# Patient Record
(demographics unavailable — no encounter records)

---

## 2019-09-17 NOTE — EDM.PDOC
ED HPI GENERAL MEDICAL PROBLEM





- General


Chief Complaint: Chest Pain


Stated Complaint: CHEST PAIN X 1 DAY


Time Seen by Provider: 09/17/19 17:59


Source of Information: Reports: Patient, RN Notes Reviewed





- History of Present Illness


INITIAL COMMENTS - FREE TEXT/NARRATIVE: 





20 year old female with onset of scratchy sore throat and nasal clarisa. 2 days 

ago that continues today.  She also has had achy chest discomfort and 

generalized myalgias. No fever or chills.  Mildly painful to swallow. Not 

coughing much.  Mainly worried about the chest discomfort.  On metropolol for 

palipations. States her dosage was just increased about 10 days ago. 


  ** Middle Chest


Pain Score (Numeric/FACES): 5





- Related Data


 Allergies











Allergy/AdvReac Type Severity Reaction Status Date / Time


 


No Known Allergies Allergy   Verified 09/17/19 16:43











Home Meds: 


 Home Meds





Metoprolol Succinate 50 mg PO DAILY 09/17/19 [History]


Venlafaxine [Effexor XR] 150 mg PO DAILY 09/17/19 [History]











Past Medical History


Cardiovascular History: Reports: Other (See Below)


Other Cardiovascular History: heart palpitations


Psychiatric History: Reports: Depression





Social & Family History





- Tobacco Use


Smoking Status *Q: Never Smoker


Second Hand Smoke Exposure: No





- Caffeine Use


Caffeine Use: Reports: Coffee





- Recreational Drug Use


Recreational Drug Use: No





ED ROS GENERAL





- Review of Systems


Review Of Systems: See Below


Constitutional: Denies: Fever, Chills


HEENT: Reports: Rhinitis, Throat Pain (mild).  Denies: Throat Swelling


Respiratory: Reports: Cough (occasional).  Denies: Shortness of Breath, Wheezing

, Pleuritic Chest Pain


Cardiovascular: Reports: Chest Pain.  Denies: Palpitations


GI/Abdominal: Denies: Abdominal Pain, Nausea, Vomiting


Musculoskeletal: Denies: Leg Pain


Skin: Reports: No Symptoms





ED EXAM, GENERAL





- Physical Exam


Exam: See Below


General Appearance: Alert, No Apparent Distress


Eye Exam: Bilateral Eye: PERRL


Throat/Mouth: Normal Inspection, Normal Oropharynx


Head: Atraumatic.  No: Facial Swelling


Neck: Supple, Full Range of Motion.  No: Lymphadenopathy (L), Lymphadenopathy (R

)


Respiratory/Chest: No Respiratory Distress, Lungs Clear, Normal Breath Sounds.  

No: Respiratory Distress, Rhonchi, Wheezing


Cardiovascular: Regular Rate, Rhythm


Extremities: Normal Inspection


Neurological: Alert, Oriented, No Motor/Sensory Deficits


Skin Exam: Warm, Dry, Normal Color





EKG INTERPRETATION


EKG Date: 09/17/19


Rhythm: NSR


Axis: Normal


P-Wave: Present


QRS: Normal


ST-T: Normal





Course





- Vital Signs


Last Recorded V/S: 


 Last Vital Signs











Temp  97.7 F   09/17/19 16:42


 


Pulse  69   09/17/19 16:42


 


Resp  15   09/17/19 16:42


 


BP  130/77   09/17/19 16:42


 


Pulse Ox  99   09/17/19 16:42














- Orders/Labs/Meds


Orders: 


 Active Orders 24 hr











 Category Date Time Status


 


 EKG Documentation Completion [RC] ASDIRECTED Care  09/17/19 17:44 Active


 


 Chest 1V Frontal [CR] Stat Exams  09/17/19 17:44 Taken


 


 EKG 12 Lead [EK] Stat Ther  09/17/19 17:44 Ordered














- Re-Assessments/Exams


Free Text/Narrative Re-Assessment/Exam: 





09/17/19 18:52


CXR also nl, discharge instr. as documented





Departure





- Departure


Time of Disposition: 18:41


Disposition: Home, Self-Care 01


Condition: Fair


Clinical Impression: 


Upper respiratory infection


Qualifiers:


 URI type: unspecified viral URI Qualified Code(s): J06.9 - Acute upper 

respiratory infection, unspecified





Instructions:  Upper Respiratory Infection, Adult, Easy-to-Read


Referrals: 


PCP,None [Primary Care Provider] - 


Forms:  ED Department Discharge


Additional Instructions: 


rest, vaporizer or steam as needed,  tylenol or ibuprofen as needed for 

discomfort, follow up clinic if not much better within 2 to 3 days as expected, 

return to ED as needed if symptoms worsening in any way. 





- My Orders


Last 24 Hours: 


My Active Orders





09/17/19 17:44


EKG Documentation Completion [RC] ASDIRECTED 


Chest 1V Frontal [CR] Stat 


EKG 12 Lead [EK] Stat 














- Assessment/Plan


Last 24 Hours: 


My Active Orders





09/17/19 17:44


EKG Documentation Completion [RC] ASDIRECTED 


Chest 1V Frontal [CR] Stat 


EKG 12 Lead [EK] Stat

## 2019-09-18 NOTE — CR
Chest: Portable view of the chest was obtained.

 

Comparison: No previous chest x-ray.

 

Heart size and mediastinum are normal.  Lungs are clear.  Bony 

structures are grossly intact.

 

Impression:

1.  Nothing acute is appreciated on portable chest x-ray.

 

Diagnostic code #1

## 2019-10-02 NOTE — EDM.PDOCBH
ED HPI GENERAL MEDICAL PROBLEM





- General


Chief Complaint: Behavioral/Psych


Stated Complaint: BEHAVIORAL ISSUES/CUTTING


Time Seen by Provider: 10/02/19 15:42


Source of Information: Reports: Patient


History Limitations: Reports: No Limitations





- History of Present Illness


INITIAL COMMENTS - FREE TEXT/NARRATIVE: 





The patient presents with depression and cuts.  She has a history of depression 

and anxiety and she cuts herself.  Last night she was drinking and started 

cutting her legs and did not stop until she cut up most of her anterior legs.  

She has but in the past but never this bad.  She is more depressed then normal  

She is on effexor.  She tried to get into [facility] for help but she missed her 

appointment.  She has no suicidal thoughts now.  She is not hearing voices.  

She has no other medical problems.


Onset: Gradual


Duration: Day(s): (Last night)


Location: Reports: Lower Extremity, Left, Lower Extremity, Right


Quality: Reports: Sharp


Severity: Mild


Improves with: Reports: None


Worsens with: Reports: None


Associated Symptoms: Reports: No Other Symptoms


  ** Bilateral Lower Leg


Pain Score (Numeric/FACES): 6





- Related Data


 Allergies











Allergy/AdvReac Type Severity Reaction Status Date / Time


 


No Known Allergies Allergy   Verified 10/02/19 15:46











Home Meds: 


 Home Meds





Metoprolol Succinate 50 mg PO DAILY 09/17/19 [History]


Venlafaxine [Effexor XR] 150 mg PO DAILY 09/17/19 [History]











Past Medical History


Cardiovascular History: Reports: Other (See Below)


Other Cardiovascular History: heart palpitations


Respiratory History: Reports: Bronchitis, Recurrent


Psychiatric History: Reports: Depression





Social & Family History





- Tobacco Use


Smoking Status *Q: Never Smoker





- Caffeine Use


Caffeine Use: Reports: Coffee, Energy Drinks, Soda





- Recreational Drug Use


Recreational Drug Use: No





ED ROS GENERAL





- Review of Systems


Review Of Systems: See Below


Constitutional: Reports: No Symptoms


HEENT: Reports: No Symptoms


Respiratory: Reports: No Symptoms


Cardiovascular: Reports: No Symptoms


Endocrine: Reports: No Symptoms


GI/Abdominal: Reports: No Symptoms


: Reports: No Symptoms


Musculoskeletal: Reports: Other (Multipble )





ED EXAM, BEHAVIORAL HEALTH





- Physical Exam


Exam: See Below


Exam Limited By: No Limitations


General Appearance: Alert, No Apparent Distress


Ears: Normal External Exam


Nose: Normal Inspection


Head: Atraumatic, Normocephalic


Neck: Normal Inspection


Respiratory/Chest: No Respiratory Distress, Lungs Clear, Normal Breath Sounds


Cardiovascular: Regular Rate, Rhythm, No Edema, No Murmur


GI/Abdominal: Soft, Non-Tender, No Organomegaly, No Mass


Extremities: Other (Multipls superficial lacerations to the anterior thigh and 

anterior lower leg.  )





COURSE, BEHAVIORAL HEALTH COMP





- Course


Vital Signs: 


 Last Vital Signs











Temp  97.9 F   10/02/19 15:40


 


Pulse  65   10/02/19 15:40


 


Resp  20   10/02/19 15:40


 


BP  107/78   10/02/19 15:40


 


Pulse Ox  100   10/02/19 15:40











Re-Assessment/Re-Exam: 





I had my nurse clean the wounds.  Steristrips were used on a couple of the 

lacerations to close them up.  I called [facility] and they wanted her to come at 

8am for an assessment.





Departure





- Departure


Time of Disposition: 17:05


Disposition: Home, Self-Care 01


Condition: Good


Clinical Impression: 


 Depressive disorder, Deliberate self-cutting





Lacerations of multiple sites of leg


Qualifiers:


 Encounter type: initial encounter Laterality: unspecified laterality Qualified 

Code(s): S81.819A - Laceration without foreign body, unspecified lower leg, 

initial encounter








- Discharge Information


*PRESCRIPTION DRUG MONITORING PROGRAM REVIEWED*: No


*COPY OF PRESCRIPTION DRUG MONITORING REPORT IN PATIENT RIDGE: No


Referrals: 


Yahaira Montague PA-C [Primary Care Provider] - 


Forms:  ED Department Discharge


Additional Instructions: 


Clean your wounds with warm soapy water 2 times per day and apply antibiotic 

ointment to the deeper wounds.  Take off the steri strips within a week.  Look 

for any signs of infection such as redness, swelling, pain, or discharge.  If 

you see any of these signs, please return or see your doctor.  You may need 

antibiotics.  Go to [facility] at 8am tomorrow for an assessment.  Please return 

if you are worse.